# Patient Record
Sex: MALE | Race: WHITE | NOT HISPANIC OR LATINO | Employment: OTHER | URBAN - NONMETROPOLITAN AREA
[De-identification: names, ages, dates, MRNs, and addresses within clinical notes are randomized per-mention and may not be internally consistent; named-entity substitution may affect disease eponyms.]

---

## 2017-12-01 ENCOUNTER — OFFICE VISIT (OUTPATIENT)
Dept: OTOLARYNGOLOGY | Facility: CLINIC | Age: 67
End: 2017-12-01

## 2017-12-01 ENCOUNTER — CLINICAL SUPPORT (OUTPATIENT)
Dept: AUDIOLOGY | Facility: CLINIC | Age: 67
End: 2017-12-01

## 2017-12-01 VITALS — HEIGHT: 72 IN | OXYGEN SATURATION: 97 % | WEIGHT: 177 LBS | BODY MASS INDEX: 23.98 KG/M2

## 2017-12-01 DIAGNOSIS — H90.3 SENSORINEURAL HEARING LOSS, BILATERAL: Primary | ICD-10-CM

## 2017-12-01 DIAGNOSIS — H90.3 SENSORINEURAL HEARING LOSS OF BOTH EARS: Primary | ICD-10-CM

## 2017-12-01 PROCEDURE — 99203 OFFICE O/P NEW LOW 30 MIN: CPT | Performed by: OTOLARYNGOLOGY

## 2017-12-01 NOTE — PROGRESS NOTES
STANDARD AUDIOMETRIC EVALUATION      Name:  David Swenson  :  1950  Age:  67 y.o.  Date of Evaluation:  2017      HISTORY    Reason for visit:  David Swenson is seen today for a hearing evaluation at the request of Dr. Juan Freire.  Patient reports that his wife is noticing his hearing loss and it is occurring in all situations.  He reports that it is especially hard to hear in the car.  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  He reports occasional tinnitus.       EVALUATION    See Audiogram    RESULTS        Otoscopy and Tympanometry 226 Hz :  Right Ear:  Otoscopy:  Clear ear canal          Tympanometry:  Middle ear function within normal limits    Left Ear:   Otoscopy:  Clear ear canal        Tympanometry:  Middle ear function within normal limits    Test technique:  Standard Audiometry     Pure Tone Audiometry:   Patient responded to pure tones at 15-70 dB for 250-8000 Hz in right ear, and at 10-80 dB for 250-8000 Hz in left ear.       Speech Audiometry:        Right Ear:  Speech Reception Threshold (SRT) was obtained at 25 dBHL                 Speech Discrimination scores were 92% in quiet when words were presented at 65 dBHL       Left Ear:  Speech Reception Threshold (SRT) was obtained at 20 dBHL                 Speech Discrimination scores were 72% in quiet when words were presented at 65 dBHL    Reliability:   good    IMPRESSIONS:  1.  Tympanometry results are consistent with Middle ear function within normal limits in both ears.  2.  Pure tone results are consistent with within normal limits to severe sloping sensorineural hearing loss for both ears.       RECOMMENDATIONS:  Patient is seeing the Ear Nose and Throat physician immediately following this examination.  It was a pleasure seeing David Swenson in Audiology today.  We would be happy to do further testing or discuss these test as necessary.          This document has been  electronically signed by JAYCE Glover on December 1, 2017 10:19 AM          JAYCE Glover  Licensed Audiologist

## 2017-12-04 NOTE — PROGRESS NOTES
Subjective   David Swenson is a 67 y.o. male.     History of Present Illness   Patient is here for evaluation of his hearing.  States that he has noticed he has trouble understanding what people say and frequently ask people to repeat.  It's particularly difficult to hear in a car.  Also has trouble in crowds or understanding the television.  Has trouble with more than 1 person is talking.  Says people sound like they're mumbling.  Says there are times he feels like he can hear but can't understand.  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  Denies any significant tinnitus.  No previous otologic surgery.  No family history of hearing loss.  No otorrhea, no otalgia.      The following portions of the patient's history were reviewed and updated as appropriate: allergies, current medications, past family history, past medical history, past social history, past surgical history and problem list.      David Swenson reports that he has never smoked. He has never used smokeless tobacco.  Patient is not a tobacco user and has not been counseled for use of tobacco products    No family history on file.  Negative for hearing loss at an early age  Allergies no known allergies    No current outpatient prescriptions on file.    No past medical history on file.  Denies hypertension, coronary artery disease, diabetes    Review of Systems   Constitutional: Negative for fever.   HENT: Positive for hearing loss.    All other systems reviewed and are negative.          Objective   Physical Exam  General: Well-developed well-nourished male in no acute distress.  Alert and oriented ×3. Head: Normocephalic. Face: Symmetrical strength and appearance. PERRL. EOMI. Voice:Strong. Speech:Fluent  Ears: External ears no deformity, canals no discharge, tympanic membranes intact clear and mobile bilaterally.  Nose: Nares show no discharge mass polyp or purulence.  Boggy mucosa is  present.  No gross external deformity.  Septum: Midline  Oral cavity: Lips and gums without lesions.  Tongue and floor of mouth without lesions.  Parotid and submandibular ducts unobstructed.  No mucosal lesions on the buccal mucosa or vestibule of the mouth.  Pharynx: No erythema exudate mass or ulcer  Neck: No lymphadenopathy.  No thyromegaly.  Trachea and larynx midline.  No masses in the parotid or submandibular glands.    Audiogram is obtained and reviewed and shows a bilateral normal sloping to severe sensorineural hearing loss bilaterally.  Tympanograms are type A bilaterally.  Discrimination scores are 92% on the right 72% on the left        Assessment/Plan   David was seen today for hearing loss.    Diagnoses and all orders for this visit:    Sensorineural hearing loss of both ears        Plan: Explained to the patient the no medicine or surgery was likely to improve his hearing.  He should avoid unnecessary exposure loud noise and use ear protection when unavoidably around loud noise.  He is medically cleared for amplification if he so desires.  Advise reevaluation in a year with another hearing test and call sooner for problems.